# Patient Record
Sex: FEMALE
[De-identification: names, ages, dates, MRNs, and addresses within clinical notes are randomized per-mention and may not be internally consistent; named-entity substitution may affect disease eponyms.]

---

## 2022-11-26 ENCOUNTER — HOSPITAL ENCOUNTER (EMERGENCY)
Dept: HOSPITAL 97 - ER | Age: 42
Discharge: HOME | End: 2022-11-26
Payer: SELF-PAY

## 2022-11-26 VITALS — TEMPERATURE: 99.3 F

## 2022-11-26 VITALS — SYSTOLIC BLOOD PRESSURE: 143 MMHG | DIASTOLIC BLOOD PRESSURE: 71 MMHG | OXYGEN SATURATION: 100 %

## 2022-11-26 DIAGNOSIS — R07.9: Primary | ICD-10-CM

## 2022-11-26 DIAGNOSIS — R42: ICD-10-CM

## 2022-11-26 DIAGNOSIS — R06.02: ICD-10-CM

## 2022-11-26 LAB
BUN BLD-MCNC: 13 MG/DL (ref 7–18)
GLUCOSE SERPLBLD-MCNC: 97 MG/DL (ref 74–106)
HCT VFR BLD CALC: 38.8 % (ref 36–45)
LYMPHOCYTES # SPEC AUTO: 2.4 K/UL (ref 0.7–4.9)
MAGNESIUM SERPL-MCNC: 2.2 MG/DL (ref 1.8–2.4)
MCV RBC: 81.3 FL (ref 80–100)
PMV BLD: 8.2 FL (ref 7.6–11.3)
POTASSIUM SERPL-SCNC: 4.1 MMOL/L (ref 3.5–5.1)
RBC # BLD: 4.77 M/UL (ref 3.86–4.86)
TROPONIN I SERPL HS-MCNC: 4.3 PG/ML (ref ?–58.9)

## 2022-11-26 PROCEDURE — 85379 FIBRIN DEGRADATION QUANT: CPT

## 2022-11-26 PROCEDURE — 36415 COLL VENOUS BLD VENIPUNCTURE: CPT

## 2022-11-26 PROCEDURE — 83735 ASSAY OF MAGNESIUM: CPT

## 2022-11-26 PROCEDURE — 85025 COMPLETE CBC W/AUTO DIFF WBC: CPT

## 2022-11-26 PROCEDURE — 80048 BASIC METABOLIC PNL TOTAL CA: CPT

## 2022-11-26 PROCEDURE — 71275 CT ANGIOGRAPHY CHEST: CPT

## 2022-11-26 PROCEDURE — 84484 ASSAY OF TROPONIN QUANT: CPT

## 2022-11-26 PROCEDURE — 71045 X-RAY EXAM CHEST 1 VIEW: CPT

## 2022-11-26 PROCEDURE — 93005 ELECTROCARDIOGRAM TRACING: CPT

## 2022-11-26 PROCEDURE — 99284 EMERGENCY DEPT VISIT MOD MDM: CPT

## 2022-11-26 NOTE — EDPHYS
Physician Documentation                                                                           

 [facility]                                                                 

Name: Sol Good                                                                              

Age: 42 yrs                                                                                       

Sex: Female                                                                                       

: 1980                                                                                   

MRN: [id number]                                                                                   

Arrival Date: 2022                                                                          

Time: 10:50                                                                                       

Account#: [id number]                                                                            

Bed 23                                                                                            

Private MD:                                                                                       

ED Physician Carlito Pedraza                                                                         

HPI:                                                                                              

                                                                                             

11:09 This 42 yrs old  Female presents to ER via Ambulatory with complaints of Chest  ms3 

      Pain, Arm Pain, Dizziness.                                                                  

11:09 The patient or guardian reports chest pain that is located primarily in the left chest. ms3 

      Onset: yesterday. The pain does not radiate. Associated signs and symptoms: Pertinent       

      positives: dizziness, nausea. The chest pain is described as a pressure. Duration: The      

      patient or guardian reports a single episode, that is still ongoing, and unchanged.         

      Modifying factors: The symptoms are alleviated by nothing. the symptoms are aggravated      

      by nothing. Severity of pain: At its worst the pain was moderate in the emergency           

      department the pain is unchanged is a 8 / 10.                                               

                                                                                                  

OB/GYN:                                                                                           

11:07 LMP 2022                                                                           jl7 

                                                                                                  

Historical:                                                                                       

- Allergies:                                                                                      

11:07 No Known Allergies;                                                                     jl7 

- Home Meds:                                                                                      

11:07 None [Active];                                                                          jl7 

- PMHx:                                                                                           

11:07 None;                                                                                   jl7 

- PSHx:                                                                                           

11:07 None;                                                                                   jl7 

                                                                                                  

- Immunization history:: Client reports receiving the 2nd dose of the Covid vaccine.              

- Social history:: Smoking status: Patient reports the use of cigarette tobacco                   

  products, smokes one-half pack cigarettes per day.                                              

                                                                                                  

                                                                                                  

ROS:                                                                                              

11:09 Constitutional: Negative for fever, and chills. ENT: Negative for injury, pain, and     ms3 

      discharge, Neck: Negative for injury, pain, and swelling.                                   

11:09 MS/Extremity: Negative for injury and deformity, Skin: Negative for injury, rash, and       

      discoloration.                                                                              

11:09 Cardiovascular: Positive for chest pain.                                                    

11:09 Respiratory: Positive for shortness of breath.                                              

11:09 Neuro: Positive for dizziness.                                                              

11:09 All other systems are negative.                                                             

                                                                                                  

Exam:                                                                                             

11:09 Constitutional:  This is a well developed, well nourished patient who is awake, alert,  ms3 

      and in no acute distress. Head/Face:  Normocephalic, atraumatic. Eyes:  Pupils equal        

      round and reactive to light, extra-ocular motions intact.  Lids and lashes normal.          

      Conjunctiva and sclera are non-icteric and not injected.  Periorbital areas with no         

      swelling, redness, or edema. ENT:  Nares patent. No nasal discharge, no septal              

      abnormalities noted.  Tympanic membranes are normal and external auditory canals are        

      clear.  Oropharynx with no redness, swelling, or masses, exudates, or evidence of           

      obstruction, uvula midline.  Mucous membranes moist. Neck:  Trachea midline, no             

      cervical lymphadenopathy.  Supple, full range of motion without nuchal rigidity, or         

      vertebral point tenderness.  No Meningismus. Chest/axilla:  Normal chest wall               

      appearance and motion.  Nontender with no deformity.   Cardiovascular:  Regular rate        

      and rhythm with a normal S1 and S2.  No gallops, murmurs, or rubs.  Normal PMI, no JVD.     

       No pulse deficits. Respiratory:  Lungs have equal breath sounds bilaterally, clear to      

      auscultation and percussion.  No rales, rhonchi or wheezes noted.  No increased work of     

      breathing, no retractions or nasal flaring. Abdomen/GI:  Soft, non-tender, with normal      

      bowel sounds.  No distension or tympany.  No guarding or rebound.  No evidence of           

      tenderness throughout. Back:  No spinal tenderness.  No costovertebral tenderness.          

      Full range of motion. Skin:  Warm, dry with normal turgor.  Normal color with no            

      rashes, no lesions, and no evidence of cellulitis. MS/ Extremity:  Pulses equal, no         

      cyanosis.  Neurovascular intact.  Full, normal range of motion. Neuro:  Awake and           

      alert, GCS 15, oriented to person, place, time, and situation.  Cranial nerves II-XII       

      grossly intact.  Motor strength 5/5 in all extremities.  Sensory grossly intact.            

      Cerebellar exam normal.  Normal gait.                                                       

11:09 ECG was reviewed by the Attending Physician.                                            ms3 

                                                                                                  

Vital Signs:                                                                                      

11:11  / 75; Pulse 76; Resp 17; Temp 99.3; Pulse Ox 99% on R/A; Weight 117.93 kg;       tp1 

      Height 5 ft. 4 in. (162.56 cm);                                                             

12:24  / 73; Pulse 68; Resp 24; Pulse Ox 97% on R/A;                                    tp1 

13:22  / 71; Pulse 74; Resp 18; Pulse Ox 100% on R/A;                                   tp1 

11:11 Body Mass Index 44.63 (117.93 kg, 162.56 cm)                                            tp1 

                                                                                                  

MDM:                                                                                              

11:09 Patient medically screened.                                                             ms3 

11:09 Differential diagnosis: abnormal EKG, acute myocardial infarction, coronary artery      ms3 

      disease chest wall pain, pneumonia, pneumothorax, pulmonary embolus.                        

13:30 HEART Score: History: Slightly Suspicious (0), ECG: Normal (0), Age: < or = 45 years    ms3 

      (0), Risk Factors: No Risk Factors Known (0), Troponin: < or = 1 x Normal Limit (0),        

      Total Score = 0. The patient was given aspirin in the Emergency Department. Data            

      reviewed: vital signs, nurses notes, lab test result(s), EKG, radiologic studies, and       

      as a result, I will discharge patient. Data interpreted: Cardiac monitor: rate is 62        

      beats/min, rhythm is normal sinus rhythm, regular, with no ectopy, Interpretation:          

      normal rate, normal rhythm. Counseling: I had a detailed discussion with the patient        

      and/or guardian regarding: the historical points, exam findings, and any diagnostic         

      results supporting the discharge/admit diagnosis, lab results, radiology results, the       

      need for outpatient follow up, to return to the emergency department if symptoms worsen     

      or persist or if there are any questions or concerns that arise at home. Special            

      discussion: Based on the patient's history, exam, and Dx evaluation, there is no            

      indication for emergent intervention or inpatient Tx. It is understood by the               

      patient/guardian that if the Sx's persist or worsen they need to return immediately for     

      re-evaluation.                                                                              

                                                                                                  

                                                                                             

11:09 Order name: Basic Metabolic Panel; Complete Time: 12:18                                 ms3 

                                                                                             

11:09 Order name: CBC with Diff; Complete Time: 11:42                                         ms3 

                                                                                             

11:09 Order name: D-Dimer; Complete Time: 12:18                                               ms3 

                                                                                             

11:09 Order name: Magnesium; Complete Time: 12:18                                             ms3 

                                                                                             

11:09 Order name: Troponin HS; Complete Time: 12:18                                           ms3 

                                                                                             

11:09 Order name: XRAY Chest (1 view); Complete Time: 11:42                                   ms3 

                                                                                             

11:09 Order name: EKG; Complete Time: 11:10                                                   ms3 

                                                                                             

11:09 Order name: Cardiac monitoring; Complete Time: 11:14                                    ms3 

                                                                                             

11:09 Order name: EKG - Nurse/Tech; Complete Time: 11:14                                      ms3 

                                                                                             

11:09 Order name: IV Saline Lock; Complete Time: 11:21                                        ms3 

                                                                                             

12:08 Order name: CT Chest For PE Angio; Complete Time: 13:18                                 ms3 

                                                                                             

11:09 Order name: Labs collected and sent; Complete Time: 11:21                               ms3 

                                                                                             

11:09 Order name: O2 Per Protocol; Complete Time: 11:14                                       ms3 

                                                                                             

11:09 Order name: O2 Sat Monitoring; Complete Time: 11:14                                     ms3 

                                                                                                  

EC: Rate is 70 beats/min. Rhythm is regular. Left axis deviation noted. QT interval is      ms3 

      normal. Clinical impression: NSR w/ Non-specific ST/T Changes. Interpreted by me.           

      Reviewed by me.                                                                             

                                                                                                  

Administered Medications:                                                                         

11:23 Drug: Aspirin Chewable Tablet 324 mg Route: PO;                                         tp1 

12:24 Follow up: Response: Pain is decreased                                                  tp1 

11:23 Drug: Meclizine 50 mg Route: PO;                                                        tp1 

12:23 Follow up: Response: Marked relief of symptoms                                          tp1 

13:22 Drug: Tylenol 1000 mg Route: PO;                                                        tp1 

13:45 Follow up: Response: Medication administered at discharge.                              tp1 

                                                                                                  

                                                                                                  

Disposition Summary:                                                                              

22 13:29                                                                                    

Discharge Ordered                                                                                 

      Location: Home                                                                          ms3 

      Condition: Stable                                                                       ms3 

      Diagnosis                                                                                   

        - Chest pain, unspecified                                                             ms3 

        - Shortness of breath                                                                 ms3 

        - dizziness                                                                           ms3 

      Followup:                                                                               ms3 

        - With: Pranav El MD                                                                 

        - When: 2 - 3 days                                                                         

        - Reason: Recheck today's complaints                                                       

      Discharge Instructions:                                                                     

        - Discharge Summary Sheet                                                             ms3 

        - Nonspecific Chest Pain, Adult                                                       ms3 

        - Musculoskeletal Pain                                                                ms3 

      Forms:                                                                                      

        - Medication Reconciliation Form                                                      ms3 

        - Thank You Letter                                                                    ms3 

        - Antibiotic Education                                                                ms3 

        - Prescription Opioid Use                                                             ms3 

Signatures:                                                                                       

Dispatcher [employer]                           Marie Hinton RN                        RN   jl7                                                  

Carlito Pedraza DO                        DO   ms3                                                  

Blanca Tesfaye, JUAQUIN                     RN   tp1                                                  

                                                                                                  

**************************************************************************************************

## 2022-11-26 NOTE — RAD REPORT
EXAM DESCRIPTION:  RAD - Chest Single View - 11/26/2022 11:26 am

 

CLINICAL HISTORY:  CHEST PAIN

 

COMPARISON:  No comparisons

 

FINDINGS:  Lines: None.

Lungs: No evidence of edema or pneumonia.

Pleural: No significant pleural effusions or pneumothorax.

Cardiac: The heart size is within normal limits.

Mediastinum: Within normal limits.

Bones: No acute fractures.

Other: None

 

IMPRESSION:  No acute cardiopulmonary disease.

## 2022-11-26 NOTE — ER
Nurse's Notes                                                                                     

 [facility]                                                                 

Name: Sol Good                                                                              

Age: 42 yrs                                                                                       

Sex: Female                                                                                       

: 1980                                                                                   

MRN: [id number]                                                                                   

Arrival Date: 2022                                                                          

Time: 10:50                                                                                       

Account#: [id number]                                                                            

Bed 23                                                                                            

Private MD:                                                                                       

Diagnosis: Chest pain, unspecified;Shortness of breath;dizziness                                  

                                                                                                  

Presentation:                                                                                     

                                                                                             

11:06 Chief complaint: Patient states: Yesterday started feeling a little dizzy then          jl7 

      left-sided chest pressure. Coronavirus screen: Vaccine status: Patient reports              

      receiving the 2nd dose of the covid vaccine. At this time, the client does not indicate     

      any symptoms associated with coronavirus-19. Ebola Screen: No symptoms or risks             

      identified at this time. Risk Assessment: Do you want to hurt yourself or someone else?     

      Patient reports no desire to harm self or others. Onset of symptoms was 2022.                                                                                       

11:06 Method Of Arrival: Ambulatory                                                           [facility] 

11:06 Acuity: [place] 2                                                                           jl7 

11:14 Initial Sepsis Screen: Does the patient meet any 2 criteria? No. Patient's initial      tp1 

      sepsis screen is negative. Does the patient have a suspected source of infection? No.       

      Patient's initial sepsis screen is negative.                                                

                                                                                                  

Triage Assessment:                                                                                

11:07 General: Appears in no apparent distress. uncomfortable, Behavior is calm, cooperative, jl7 

      appropriate for age. Pain: Complains of pain in anterior aspect of left upper chest.        

      Neuro: Level of Consciousness is awake, alert, obeys commands, Oriented to person,          

      place, time, situation. Cardiovascular: Patient's skin is warm and dry. Respiratory:        

      Airway is patent Respiratory effort is even, unlabored, Respiratory pattern is regular,     

      symmetrical. Derm: Skin is pink, warm \T\ dry.                                              

                                                                                                  

OB/GYN:                                                                                           

11:07 LMP 2022                                                                           jl7 

                                                                                                  

Historical:                                                                                       

- Allergies:                                                                                      

11:07 No Known Allergies;                                                                     jl7 

- Home Meds:                                                                                      

11:07 None [Active];                                                                          jl7 

- PMHx:                                                                                           

11:07 None;                                                                                   jl7 

- PSHx:                                                                                           

11:07 None;                                                                                   jl7 

                                                                                                  

- Immunization history:: Client reports receiving the 2nd dose of the Covid vaccine.              

- Social history:: Smoking status: Patient reports the use of cigarette tobacco                   

  products, smokes one-half pack cigarettes per day.                                              

                                                                                                  

                                                                                                  

Screenin:14 Abuse screen: Denies threats or abuse. Denies injuries from another. Nutritional        tp1 

      screening: No deficits noted. Tuberculosis screening: No symptoms or risk factors           

      identified. Fall Risk None identified.                                                      

                                                                                                  

Assessment:                                                                                       

11:12 General: Appears in no apparent distress. uncomfortable, Behavior is calm, cooperative. tp1 

      Pain: Complains of pain in chest Pain radiates to back Pain currently is 8 out of 10 on     

      a pain scale. Quality of pain is described as pressure, Pain began 1 day ago. Neuro:        

      Level of Consciousness is awake, alert, obeys commands, Oriented to person, place,          

      time, situation. Neuro: Reports dizziness, headache. Cardiovascular: Reports Patient's      

      skin is warm and dry. Cardiovascular: Reports Denies lightheadedness, shortness of          

      breath. Respiratory: Airway is patent Respiratory effort is even, unlabored. GI:            

      Abdomen is obese, Reports nausea. : No signs and/or symptoms were reported regarding      

      the genitourinary system. EENT: No signs and/or symptoms were reported regarding the        

      EENT system. Derm: Skin is pink, warm \T\ dry. Musculoskeletal: Circulation, motion, and    

      sensation intact.                                                                           

12:24 Reassessment: Patient appears in no apparent distress at this time. No changes from     tp1 

      previously documented assessment. Patient and/or family updated on plan of care and         

      expected duration. Pain level reassessed. Patient is alert, oriented x 3, equal             

      unlabored respirations, skin warm/dry/pink. states pain has decreased.                      

13:16 Reassessment: Patient appears in no apparent distress at this time. No changes from     tp1 

      previously documented assessment. Patient is alert, oriented x 3, equal unlabored           

      respirations, skin warm/dry/pink. CO 7/10 headache, provider notified.                      

13:18 Reassessment: received VO from DR. Pedraza to administer Tylenol 1000mg PO X1.             tp1 

                                                                                                  

Vital Signs:                                                                                      

11:11  / 75; Pulse 76; Resp 17; Temp 99.3; Pulse Ox 99% on R/A; Weight 117.93 kg;       tp1 

      Height 5 ft. 4 in. (162.56 cm);                                                             

12:24  / 73; Pulse 68; Resp 24; Pulse Ox 97% on R/A;                                    tp1 

13:22  / 71; Pulse 74; Resp 18; Pulse Ox 100% on R/A;                                   tp1 

11:11 Body Mass Index 44.63 (117.93 kg, 162.56 cm)                                            tp1 

                                                                                                  

ED Course:                                                                                        

10:50 Patient arrived in ED.                                                                  as  

11:03 Carlito Pedraza DO is Attending Physician.                                                ms3 

11:07 Triage completed.                                                                       jl7 

11:07 Arm band placed on right wrist.                                                         jl7 

11:11 Blanca Tesfaye, RN is Primary Nurse.                                                   tp1 

11:11 Patient has correct armband on for positive identification. Bed in low position. Call   tp1 

      light in reach. Client placed on continuous cardiac and pulse oximetry monitoring. NIBP     

      monitoring applied.                                                                         

11:11 EKG done, by ED staff. Patient maintains SpO2 saturation greater than 95% on room air.  tp1 

11:12 No provider procedures requiring assistance completed.                                  tp1 

11:21 Basic Metabolic Panel Sent.                                                             mm9 

11:21 CBC with Diff Sent.                                                                     mm9 

11:21 D-Dimer Sent.                                                                           mm9 

11:21 Magnesium Sent.                                                                         mm9 

11:21 Troponin HS Sent.                                                                       mm9 

11:22 Warm blanket given.                                                                     mm9 

11:22 Initial lab(s) drawn, by me, sent to lab. Inserted saline lock: 20 gauge in right       mm9 

      antecubital area, using aseptic technique. Blood collected.                                 

11:28 XRAY Chest (1 view) In Process Unspecified.                                             EDMS

12:55 CT Chest For PE Angio In Process Unspecified.                                           EDMS

13:28 Pranav El MD is Referral Physician.                                               ms3 

13:45 IV discontinued, intact, bleeding controlled, No redness/swelling at site. Pressure     tp1 

      dressing applied.                                                                           

                                                                                                  

Administered Medications:                                                                         

11:23 Drug: Aspirin Chewable Tablet 324 mg Route: PO;                                         tp1 

12:24 Follow up: Response: Pain is decreased                                                  tp1 

11:23 Drug: Meclizine 50 mg Route: PO;                                                        tp1 

12:23 Follow up: Response: Marked relief of symptoms                                          tp1 

13:22 Drug: Tylenol 1000 mg Route: PO;                                                        tp1 

13:45 Follow up: Response: Medication administered at discharge.                              tp1 

                                                                                                  

                                                                                                  

Medication:                                                                                       

11:14 VIS not applicable for this client.                                                     tp1 

                                                                                                  

Outcome:                                                                                          

13:29 Discharge ordered by MD.                                                                ms3 

13:45 Discharged to home ambulatory.                                                          tp1 

13:45 Condition: good                                                                             

13:45 Discharge instructions given to patient, Instructed on discharge instructions, follow       

      up and referral plans. Demonstrated understanding of instructions, follow-up care.          

13:46 Patient left the ED.                                                                    tp1 

                                                                                                  

Signatures:                                                                                       

Dispatcher [employer]                           Majo Salvador Jahala, RN                        RN   jl7                                                  

Carlito Pedraza DO DO   ms3                                                  

Blanca Tesfaye RN                     RN   tp1                                                  

Sheri Iglesias                              mm9                                                  

                                                                                                  

Corrections: (The following items were deleted from the chart)                                    

 11:12 Neuro: Reports headache tp1                                                       tp1 

 11:12 Cardiovascular: Denies lightheadedness, shortness of breath, tp1                  tp1 

                                                                                                  

**************************************************************************************************

## 2022-11-26 NOTE — RAD REPORT
EXAM DESCRIPTION:  CT - Chest For Pe Angio - 11/26/2022 12:53 pm

 

CLINICAL HISTORY:  chest pain, shortness of breath

 

COMPARISON:  No comparisons

 

TECHNIQUE:  Dynamically enhanced axial 3 mm thick images of the chest were obtained during administra
tion of <100> mL Isovue 370 IV contrast. Coronal and oblique reconstruction images were generated and
 reviewed. Exam utilizes a protocol for optimal evaluation of pulmonary arterial tree.

 

Maximum intensity projections 3D imaging was utilized

 

All CT scans are performed using dose optimization technique as appropriate and may include automated
 exposure control or mA/KV adjustment according to patient size.

 

FINDINGS:  Chest Wall: No suspicious thyroid nodules or pathologic lymphadenopathy.

Lungs: No acute abnormality.

Pleura: No significant effusions or pneumothorax.

Mediastinum/haritha: No pathologic lymphadenopathy. Circumferential thickening of distal esophagus may

Pulmonary arteries/Aorta: No filling defect identified. No aortic aneurysm.

Heart: No significant pericardial effusion. Normal heart size.

Upper abdomen: No acute abnormality.Hepatic steatosis.

Bones: No acute abnormality. Bridging osteophytes in the spine.

 

IMPRESSION:  Negative for pulmonary embolism. No acute findings within the chest.

## 2022-11-28 NOTE — EKG
Test Date:    2022-11-26               Test Time:    11:09:06

Technician:   BRUNO                                     

                                                     

MEASUREMENT RESULTS:                                       

Intervals:                                           

Rate:         70                                     

ND:           128                                    

QRSD:         90                                     

QT:           370                                    

QTc:          399                                    

Axis:                                                

P:            38                                     

ND:           128                                    

QRS:          63                                     

T:            23                                     

                                                     

INTERPRETIVE STATEMENTS:                                       

                                                     

Normal sinus rhythm

Nonspecific T wave abnormality

Abnormal ECG

No previous ECG available for comparison



Electronically Signed On 11-28-22 12:50:24 CST by Pranav El